# Patient Record
Sex: FEMALE | Race: WHITE | NOT HISPANIC OR LATINO | URBAN - METROPOLITAN AREA
[De-identification: names, ages, dates, MRNs, and addresses within clinical notes are randomized per-mention and may not be internally consistent; named-entity substitution may affect disease eponyms.]

---

## 2019-10-20 ENCOUNTER — EMERGENCY (EMERGENCY)
Facility: HOSPITAL | Age: 59
LOS: 1 days | Discharge: HOME | End: 2019-10-20
Attending: EMERGENCY MEDICINE | Admitting: EMERGENCY MEDICINE
Payer: MEDICAID

## 2019-10-20 VITALS
WEIGHT: 218.92 LBS | HEIGHT: 70 IN | HEART RATE: 60 BPM | OXYGEN SATURATION: 96 % | DIASTOLIC BLOOD PRESSURE: 71 MMHG | RESPIRATION RATE: 18 BRPM | SYSTOLIC BLOOD PRESSURE: 106 MMHG | TEMPERATURE: 98 F

## 2019-10-20 DIAGNOSIS — B00.9 HERPESVIRAL INFECTION, UNSPECIFIED: Chronic | ICD-10-CM

## 2019-10-20 DIAGNOSIS — R85.81 ANAL HIGH RISK HUMAN PAPILLOMAVIRUS (HPV) DNA TEST POSITIVE: Chronic | ICD-10-CM

## 2019-10-20 PROCEDURE — 99283 EMERGENCY DEPT VISIT LOW MDM: CPT

## 2019-10-20 NOTE — ED PROVIDER NOTE - NSFOLLOWUPINSTRUCTIONS_ED_ALL_ED_FT
Head Injury, Adult  ImageThere are many types of head injuries. Head injuries can be as minor as a bump, or they can be more severe. More severe head injuries include:  A jarring injury to the brain (concussion).A bruise of the brain (contusion). This means there is bleeding in the brain that can cause swelling.A cracked skull (skull fracture).Bleeding in the brain that collects, clots, and forms a bump (hematoma).After a head injury, you may need to be observed for a while in the emergency department or urgent care. Sometimes admission to the hospital is needed.  After a head injury has happened, most problems occur within the first 24 hours, but side effects may occur up to 7–10 days after the injury. It is important to watch your condition for any changes.  What are the causes?  There are many possible causes of a head injury. A serious head injury may happen to someone who is in a car accident (motor vehicle collision). Other causes of major head injuries include bicycle or motorcycle accidents, sports injuries, and falls.  Risk factors  This condition is more likely to occur in people who:  Drink a lot of alcohol or use drugs.Are over the age of 65.Are at risk for falls.What are the symptoms?  There are many possible symptoms of a head injury. Visible symptoms of a head injury include a bruise, bump, or bleeding at the site of the injury. Other non-visible symptoms include:  Feeling sleepy or not being able to stay awake.Passing out.Headache.Seizures.Dizziness.Confusion.Memory problems.Nausea or vomiting.Other possible symptoms that may develop after the head injury include:  Poor attention and concentration.Fatigue or tiring easily.Irritability.Being uncomfortable around bright lights or loud noises.Anxiety or depression.Disturbed sleep.How is this diagnosed?  This condition can usually be diagnosed based on your symptoms, a description of the injury, and a physical exam. You may also have imaging tests done, such as a CT scan or MRI. You will also be closely watched.  How is this treated?  Treatment for this condition depends on the severity and type of injury you have. The main goal of treatment is to prevent complications and allow the brain time to heal.  For mild head injury, you may be sent home and treatment may include:  Observation. A responsible adult should stay with you for 24 hours after your injury and check on you often.Physical rest.Brain rest.Pain medicines.For severe brain injury, treatment may include:  Close observation. This includes hospitalization with frequent physical exams. You may need to go to a hospital that specializes in head injury.Pain medicines.Breathing support. This may include using a ventilator.Managing the pressure inside the brain (intracranial pressure, or ICP). This may include:  Monitoring the ICP.Giving medicines to decrease the ICP.Positioning you to decrease the ICP.Medicine to prevent seizures.Surgery to stop bleeding or to remove blood clots (craniotomy).Surgery to remove part of the skull (decompressive craniectomy). This allows room for the brain to swell.Follow these instructions at home:  Activity     Rest as much as possible and avoid activities that are physically hard or tiring.Make sure you get enough sleep.Limit activities that require a lot of thought or attention, such as:  Watching TV.Playing memory games and puzzles.Job-related work or homework.Working on the computer, social media, and texting.Avoid activities that could cause another head injury, such as playing sports, until your health care provider approves. Having another head injury, especially before the first one has healed, can be dangerous.Ask your health care provider when it is safe for you to return to your regular activities, including work or school. Ask your health care provider for a step-by-step plan for gradually returning to activities.Ask your health care provider when you can drive, ride a bicycle, or use heavy machinery. Your ability to react may be slower after a brain injury. Never do these activities if you are dizzy.Lifestyle     Do not drink alcohol until your health care provider approves, and avoid drug use. Alcohol and certain drugs may slow your recovery and can put you at risk of further injury.If it is harder than usual to remember things, write them down.If you are easily distracted, try to do one thing at a time.Talk with family members or close friends when making important decisions.Tell your friends, family, a trusted colleague, and  about your injury, symptoms, and restrictions. Have them watch for any new or worsening problems.General instructions     Take over-the-counter and prescription medicines only as told by your health care provider.Have someone stay with you for 24 hours after your head injury. This person should watch you for any changes in your symptoms and be ready to seek medical help, as needed.Keep all follow-up visits as told by your health care provider. This is important.How is this prevented?  Work on improving your balance and strength to avoid falls.Wear a seatbelt when you are in a moving vehicle.Wear a helmet when riding a bicycle, skiing, or doing any other sport or activity that has a risk of injury.Drink alcohol only in moderation.Take safety measures in your home, such as:  Removing clutter and tripping hazards from floors and stairways.Using grab bars in bathrooms and handrails by stairs.Placing non-slip mats on floors and in bathtubs.Improving lighting in dim areas.Get help right away if:  You have:  A severe headache that is not helped by medicine.Trouble walking, have weakness in your arms and legs, or lose your balance.Clear or bloody fluid coming from your nose or ears.Changes in your vision.A seizure.You vomit.Your symptoms get worse.Your speech is slurred.You pass out.You are sleepier and have trouble staying awake.Your pupils change size.These symptoms may represent a serious problem that is an emergency. Do not wait to see if the symptoms will go away. Get medical help right away. Call your local emergency services (911 in the [place] Do not drive yourself to the hospital.   This information is not intended to replace advice given to you by your health care provider. Make sure you discuss any questions you have with your health care provider.    Document Released: 12/18/2006 Document Revised: 04/13/2018 Document Reviewed: 06/27/2017  Elsevier Interactive Patient Education © 2019 [employer]

## 2019-10-20 NOTE — ED PROVIDER NOTE - OBJECTIVE STATEMENT
60 y/o F with PMHx of HPV presents to the ED s/p fall. Patient states she was in the shower when she fell and banged her head on the L side and heard a cracking sensation prior to arrival. Patient notes mild pain to the hip and elbow. Patient is not on anticoagulation. Denies bleeding, nausea, LOC or any other acute complaints. 60 y/o F with PMHx of HPV presents to the ED s/p fall. Patient states she was in the shower when she slipped and hit L elbow, L side of her  head  heard a cracking sensation prior to arrival. Patient notes mild pain to the hip and elbow. Patient is not on anticoagulation. Denies bleeding, nausea, LOC or any other acute complaints.  no exac/allev factors  mild-moderate severity  no neck pain

## 2019-10-20 NOTE — ED PROVIDER NOTE - PATIENT PORTAL LINK FT
You can access the FollowMyHealth Patient Portal offered by [facility] by registering at the following website: [email or web address]. By joining [employer]’s FollowMyHealth portal, you will also be able to view your health information using other applications (apps) compatible with our system.

## 2019-10-20 NOTE — ED ADULT NURSE NOTE - OBJECTIVE STATEMENT
Pt smacked her head on the side of the tub. No LOC. No dizziness, HA, blurred vision. No obvious bleeding or deformity.

## 2019-10-20 NOTE — ED PROVIDER NOTE - ENMT, MLM
Airway patent, Nasal mucosa clear. Mouth with normal mucosa. No hemotympanum bilaterally. Airway patent, Nasal mucosa clear. Mouth with normal mucosa. No hemotympanum bilaterally.  no temporal/parietal bone ttp/stepoffs/hematoma

## 2019-10-20 NOTE — ED ADULT NURSE NOTE - NSIMPLEMENTINTERV_GEN_ALL_ED
Implemented All Universal Safety Interventions:  [place] to call system. Call bell, personal items and telephone within reach. Instruct patient to call for assistance. Room bathroom lighting operational. Non-slip footwear when patient is off stretcher. Physically safe environment: no spills, clutter or unnecessary equipment. Stretcher in lowest position, wheels locked, appropriate side rails in place.

## 2019-10-20 NOTE — ED PROVIDER NOTE - CLINICAL SUMMARY MEDICAL DECISION MAKING FREE TEXT BOX
slip and fall- hit L side of head- no loc- no CT ind as per [place] head CT rule- px w min to no ha  si/sx to return explained to px
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
## 2019-10-25 DIAGNOSIS — S09.90XA UNSPECIFIED INJURY OF HEAD, INITIAL ENCOUNTER: ICD-10-CM
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
## 2019-10-25 DIAGNOSIS — M25.522 PAIN IN LEFT ELBOW: ICD-10-CM
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
## 2020-08-17 PROBLEM — B97.7 PAPILLOMAVIRUS AS THE CAUSE OF DISEASES CLASSIFIED ELSEWHERE: Chronic | Status: ACTIVE | Noted: 2019-10-20

## 2020-09-04 PROBLEM — Z00.00 ENCOUNTER FOR PREVENTIVE HEALTH EXAMINATION: Status: ACTIVE | Noted: 2020-09-04

## 2020-09-16 ENCOUNTER — APPOINTMENT (OUTPATIENT)
Dept: HEART AND VASCULAR | Facility: CLINIC | Age: 60
End: 2020-09-16

## 2021-06-24 ENCOUNTER — APPOINTMENT (OUTPATIENT)
Dept: ORTHOPEDIC SURGERY | Facility: CLINIC | Age: 61
End: 2021-06-24
Payer: MEDICAID

## 2021-06-24 VITALS
SYSTOLIC BLOOD PRESSURE: 105 MMHG | HEART RATE: 65 BPM | HEIGHT: 70 IN | WEIGHT: 239 LBS | BODY MASS INDEX: 34.22 KG/M2 | DIASTOLIC BLOOD PRESSURE: 72 MMHG

## 2021-06-24 DIAGNOSIS — S83.242A OTHER TEAR OF MEDIAL MENISCUS, CURRENT INJURY, LEFT KNEE, INITIAL ENCOUNTER: ICD-10-CM

## 2021-06-24 DIAGNOSIS — Z82.69 FAMILY HISTORY OF OTHER DISEASES OF THE MUSCULOSKELETAL SYSTEM AND CONNECTIVE TISSUE: ICD-10-CM

## 2021-06-24 DIAGNOSIS — Z78.9 OTHER SPECIFIED HEALTH STATUS: ICD-10-CM

## 2021-06-24 DIAGNOSIS — Z60.2 PROBLEMS RELATED TO LIVING ALONE: ICD-10-CM

## 2021-06-24 DIAGNOSIS — Z80.0 FAMILY HISTORY OF MALIGNANT NEOPLASM OF DIGESTIVE ORGANS: ICD-10-CM

## 2021-06-24 DIAGNOSIS — S83.412A SPRAIN OF MEDIAL COLLATERAL LIGAMENT OF LEFT KNEE, INITIAL ENCOUNTER: ICD-10-CM

## 2021-06-24 DIAGNOSIS — Z80.3 FAMILY HISTORY OF MALIGNANT NEOPLASM OF BREAST: ICD-10-CM

## 2021-06-24 DIAGNOSIS — Z82.61 FAMILY HISTORY OF ARTHRITIS: ICD-10-CM

## 2021-06-24 DIAGNOSIS — Z72.3 LACK OF PHYSICAL EXERCISE: ICD-10-CM

## 2021-06-24 DIAGNOSIS — Z56.0 UNEMPLOYMENT, UNSPECIFIED: ICD-10-CM

## 2021-06-24 PROCEDURE — 99204 OFFICE O/P NEW MOD 45 MIN: CPT | Mod: 25

## 2021-06-24 PROCEDURE — 73564 X-RAY EXAM KNEE 4 OR MORE: CPT | Mod: RT

## 2021-06-24 PROCEDURE — 20610 DRAIN/INJ JOINT/BURSA W/O US: CPT | Mod: LT

## 2021-06-24 RX ORDER — COLD-HOT PACK
EACH MISCELLANEOUS
Refills: 0 | Status: ACTIVE | COMMUNITY

## 2021-06-24 RX ORDER — UBIDECARENONE/VIT E ACET 100MG-5
CAPSULE ORAL
Refills: 0 | Status: ACTIVE | COMMUNITY

## 2021-06-24 RX ORDER — SPIRONOLACTONE 50 MG/1
TABLET ORAL
Refills: 0 | Status: ACTIVE | COMMUNITY

## 2021-06-24 RX ORDER — MINOXIDIL 2.5 MG/1
TABLET ORAL
Refills: 0 | Status: ACTIVE | COMMUNITY

## 2021-06-24 RX ORDER — ESTRADIOL 10 UG/1
TABLET, FILM COATED VAGINAL
Refills: 0 | Status: ACTIVE | COMMUNITY

## 2021-06-24 RX ORDER — BIOTIN 10 MG
TABLET ORAL
Refills: 0 | Status: ACTIVE | COMMUNITY

## 2021-06-24 SDOH — SOCIAL STABILITY - SOCIAL INSECURITY: PROBLEMS RELATED TO LIVING ALONE: Z60.2

## 2021-06-24 SDOH — ECONOMIC STABILITY - INCOME SECURITY: UNEMPLOYMENT, UNSPECIFIED: Z56.0

## 2021-06-24 NOTE — DISCUSSION/SUMMARY
[de-identified] : Acute medial meniscus tear and MCL sprain now 4 months ago with no Rx to date except oral NSAIDS. Plus add hinged knee brace for the MCL. \par WIll attempt one more time to treat non operatively as that is her preference with cortisone and physical Therapy If no benefit down the road will consider Arthroscopic partial medial menisectomy. \par cc Dr Chaz Braun\par \par \par

## 2021-06-24 NOTE — PHYSICAL EXAM
[de-identified] : Patient is well nourished, well-developed, in no acute distress, with appropriate mood and affect. The patient is oriented to time, place, and person. Gait evaluation does not reveal a limp. The skin examination does not reveal obvious lesions, lacerations, or ecchymosis.\par Medial joint tenderness positive Tyrone and positive vielka negative lachman \par Quads 4/5 \par  [de-identified] : 4 views of knee show good normal joint spaces and no malalignment \par MRI done prior shows no chondral wear positive medial meniscus tear.

## 2021-06-24 NOTE — PROCEDURE
[de-identified] : The left knee was prepped with alchohol and ethyl chloride was used to numb the skin. First we tapped the knee and took out 20 cc joint fluid and then 3 cc injection with 1 cc xylocaine, 1cc sensoricaine and 1 cc depomedrol , was given without complication into the knee joint. Patient instructed that there may be an inflammatory flare for 24 hrs , to use ice or advil if needed\par

## 2021-06-24 NOTE — HISTORY OF PRESENT ILLNESS
[de-identified] : 60 year old female presents for left knee pain.  pain was initially in the left knee but her right knee started hurting to overcompensate for the pain in her right knee. the pain in her left knee began when she had a bone density test done. tech twisted the knee in such a way that she tore her meniscus. went to her pcp who sent her to get an x-ray at [facility]. she went back to her pcp who sent her for an MRI. also at [facility]. she went back to her pcp who recommended that she see dr alas.

## 2021-07-12 NOTE — ED PROVIDER NOTE - MUSCULOSKELETAL [+], MLM
Quality 431: Preventive Care And Screening: Unhealthy Alcohol Use - Screening: Patient screened for unhealthy alcohol use using a single question and scores less than 2 times per year elbow pain

## 2021-07-22 ENCOUNTER — APPOINTMENT (OUTPATIENT)
Dept: ORTHOPEDIC SURGERY | Facility: CLINIC | Age: 61
End: 2021-07-22
Payer: MEDICAID

## 2021-07-22 DIAGNOSIS — S83.242D OTHER TEAR OF MEDIAL MENISCUS, CURRENT INJURY, LEFT KNEE, SUBSEQUENT ENCOUNTER: ICD-10-CM

## 2021-07-22 PROCEDURE — 99213 OFFICE O/P EST LOW 20 MIN: CPT

## 2021-07-22 NOTE — DISCUSSION/SUMMARY
[de-identified] : Will continue PT to see if that can control meniscal symptoms. If not consider arthroscopic surgery in 2 months.

## 2021-07-22 NOTE — HISTORY OF PRESENT ILLNESS
[de-identified] : 59 y/o female presents for Left knee follow up. Patient states that she hasn’t been able to start PT but is due to start this week. Ha some benefit from cortisone but needs PT\par Still wants to avoid arthroscopic menisectomy if possible.

## 2021-09-23 ENCOUNTER — APPOINTMENT (OUTPATIENT)
Dept: ORTHOPEDIC SURGERY | Facility: CLINIC | Age: 61
End: 2021-09-23

## 2021-10-06 ENCOUNTER — APPOINTMENT (OUTPATIENT)
Dept: ORTHOPEDIC SURGERY | Facility: CLINIC | Age: 61
End: 2021-10-06
Payer: MEDICAID

## 2021-10-06 DIAGNOSIS — M17.12 UNILATERAL PRIMARY OSTEOARTHRITIS, LEFT KNEE: ICD-10-CM

## 2021-10-06 PROCEDURE — 99213 OFFICE O/P EST LOW 20 MIN: CPT

## 2021-10-06 NOTE — PHYSICAL EXAM
[de-identified] : Patient is well nourished, well-developed, in no acute distress, with appropriate mood and affect. The patient is oriented to time, place, and person. Gait evaluation does not reveal a limp. The skin examination does not reveal obvious lesions, lacerations, or ecchymosis.\par left knee no medial joint tenderness some medial PF tenderness and weak Quads. plus PF crepitance.

## 2021-10-06 NOTE — HISTORY OF PRESENT ILLNESS
[de-identified] : MS Easton just started PT 2 -3 weeks ago and is making some improvement.. Still has no medial joint symnptoms just anterio knee symptoms. \par

## 2021-10-06 NOTE — DISCUSSION/SUMMARY
[de-identified] : No meniscal signs at this time. 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 \par Continue [facility] PT program for 2 months.

## 2021-11-06 ENCOUNTER — EMERGENCY (EMERGENCY)
Facility: HOSPITAL | Age: 61
LOS: 1 days | Discharge: HOME | End: 2021-11-06
Attending: EMERGENCY MEDICINE | Admitting: EMERGENCY MEDICINE
Payer: MEDICAID

## 2021-11-06 VITALS
TEMPERATURE: 98 F | OXYGEN SATURATION: 98 % | DIASTOLIC BLOOD PRESSURE: 96 MMHG | HEART RATE: 54 BPM | WEIGHT: 220.02 LBS | RESPIRATION RATE: 18 BRPM | SYSTOLIC BLOOD PRESSURE: 152 MMHG | HEIGHT: 70 IN

## 2021-11-06 DIAGNOSIS — B00.9 HERPESVIRAL INFECTION, UNSPECIFIED: Chronic | ICD-10-CM

## 2021-11-06 DIAGNOSIS — S21.052A OPEN BITE OF LEFT BREAST, INITIAL ENCOUNTER: ICD-10-CM

## 2021-11-06 DIAGNOSIS — S61.452A OPEN BITE OF LEFT HAND, INITIAL ENCOUNTER: ICD-10-CM

## 2021-11-06 DIAGNOSIS — Z23 ENCOUNTER FOR IMMUNIZATION: ICD-10-CM

## 2021-11-06 DIAGNOSIS — S61.451A OPEN BITE OF RIGHT HAND, INITIAL ENCOUNTER: ICD-10-CM
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
## 2021-11-06 DIAGNOSIS — R85.81 ANAL HIGH RISK HUMAN PAPILLOMAVIRUS (HPV) DNA TEST POSITIVE: Chronic | ICD-10-CM

## 2021-11-06 PROCEDURE — 90715 TDAP VACCINE 7 YRS/> IM: CPT

## 2021-11-06 PROCEDURE — 90377 RABIES IG HT&SOL HUMAN IM/SC: CPT

## 2021-11-06 PROCEDURE — 90471 IMMUNIZATION ADMIN: CPT

## 2021-11-06 PROCEDURE — 90675 RABIES VACCINE IM: CPT

## 2021-11-06 PROCEDURE — 99284 EMERGENCY DEPT VISIT MOD MDM: CPT | Mod: 25

## 2021-11-06 PROCEDURE — 99283 EMERGENCY DEPT VISIT LOW MDM: CPT | Mod: 25

## 2021-11-06 PROCEDURE — 73120 X-RAY EXAM OF HAND: CPT | Mod: 26,LT,RT

## 2021-11-06 PROCEDURE — 73120 X-RAY EXAM OF HAND: CPT

## 2021-11-06 PROCEDURE — 73120 X-RAY EXAM OF HAND: CPT | Mod: 26

## 2021-11-06 RX ORDER — RABIES VACC, HUMAN DIPLOID/PF 2.5 UNIT
1 VIAL (EA) INTRAMUSCULAR ONCE
Refills: 0 | Status: DISCONTINUED | OUTPATIENT
Start: 2021-11-06 | End: 2021-11-06

## 2021-11-06 RX ORDER — HUMAN RABIES VIRUS IMMUNE GLOBULIN 150 [IU]/ML
2000 INJECTION, SOLUTION INTRAMUSCULAR ONCE
Refills: 0 | Status: COMPLETED | OUTPATIENT
Start: 2021-11-06 | End: 2021-11-06

## 2021-11-06 RX ORDER — TETANUS TOXOID, REDUCED DIPHTHERIA TOXOID AND ACELLULAR PERTUSSIS VACCINE, ADSORBED 5; 2.5; 8; 8; 2.5 [IU]/.5ML; [IU]/.5ML; UG/.5ML; UG/.5ML; UG/.5ML
0.5 SUSPENSION INTRAMUSCULAR ONCE
Refills: 0 | Status: COMPLETED | OUTPATIENT
Start: 2021-11-06 | End: 2021-11-06

## 2021-11-06 RX ORDER — RABIES VACC, HUMAN DIPLOID/PF 2.5 UNIT
1 VIAL (EA) INTRAMUSCULAR ONCE
Refills: 0 | Status: COMPLETED | OUTPATIENT
Start: 2021-11-06 | End: 2021-11-06

## 2021-11-06 RX ADMIN — HUMAN RABIES VIRUS IMMUNE GLOBULIN 2000 UNIT(S): 150 INJECTION, SOLUTION INTRAMUSCULAR at 16:00

## 2021-11-06 RX ADMIN — Medication 1 TABLET(S): at 15:47

## 2021-11-06 RX ADMIN — TETANUS TOXOID, REDUCED DIPHTHERIA TOXOID AND ACELLULAR PERTUSSIS VACCINE, ADSORBED 0.5 MILLILITER(S): 5; 2.5; 8; 8; 2.5 SUSPENSION INTRAMUSCULAR at 15:46

## 2021-11-06 RX ADMIN — Medication 1 MILLILITER(S): at 15:45

## 2021-11-06 NOTE — ED PROVIDER NOTE - PHYSICAL EXAMINATION
CONSTITUTIONAL: Awake, alert and in no apparent distress.  HEENT: Head is atraumatic. Eyes clear bilaterally, normal EOMI. Airway patent.  CARDIAC: Normal rate, regular rhythm.  Heart sounds S1, S2.   RESPIRATORY: Breath sounds clear and equal bilaterally. no tachypnea, respiratory distress.   GASTROINTESTINAL: Abdomen soft, non-tender, no guarding, distension.  MUSCULOSKELETAL: Spine appears normal, no midline spinal tenderness, range of motion is not limited, no muscle or joint tenderness. no bony tenderness. abrasions to b/l hands and upper wrists  NEUROLOGICAL: Alert, no focal deficits, no motor or sensory deficits.  SKIN: Skin normal color for race, warm, dry and intact. No evidence of rash.  PSYCHIATRIC: Normal mood and affect. no apparent risk to self or others.

## 2021-11-06 NOTE — ED PROVIDER NOTE - NSFOLLOWUPINSTRUCTIONS_ED_ALL_ED_FT
****Please return for vaccination 1 each on day 0 (already received), 3, 7 and 14      Wound Care, Adult      Taking care of your wound properly can help to prevent pain, infection, and scarring. It can also help your wound heal more quickly. Follow instructions from your health care provider about how to care for your wound.      Supplies needed:    •Soap and water.      •Wound cleanser.      •Gauze.      •If needed, a clean bandage (dressing) or other type of wound dressing material to cover or place in the wound. Follow your health care provider's instructions about what dressing supplies to use.      •Cream or ointment to apply to the wound, if told by your health care provider.        How to care for your wound    Cleaning the wound   Ask your health care provider how to clean the wound. This may include:  •Using mild soap and water or a wound cleanser.      •Using a clean gauze to pat the wound dry after cleaning it. Do not rub or scrub the wound.      Dressing care    •Wash your hands with soap and water for at least 20 seconds before and after you change the dressing. If soap and water are not available, use hand .    •Change your dressing as told by your health care provider. This may include:  •Cleaning or rinsing out (irrigating) the wound.      •Placing a dressing over the wound or in the wound (packing).      •Covering the wound with an outer dressing.        •Leave any stitches (sutures), skin glue, or adhesive strips in place. These skin closures may need to stay in place for 2 weeks or longer. If adhesive strip edges start to loosen and curl up, you may trim the loose edges. Do not remove adhesive strips completely unless your health care provider tells you to do that.      •Ask your health care provider when you can leave the wound uncovered.        Checking for infection  Check your wound area every day for signs of infection. Check for:  •More redness, swelling, or pain.      •Fluid or blood.      •Warmth.      •Pus or a bad smell.        Follow these instructions at home    Medicines     •If you were prescribed an antibiotic medicine, cream, or ointment, take or apply it as told by your health care provider. Do not stop using the antibiotic even if your condition improves.      •If you were prescribed pain medicine, take it 30 minutes before you do any wound care or as told by your health care provider.      •Take over-the-counter and prescription medicines only as told by your health care provider.      Eating and drinking   •Eat a diet that includes protein, vitamin A, vitamin C, and other nutrient-rich foods to help the wound heal.  •Foods rich in protein include meat, fish, eggs, dairy, beans, and nuts.      •Foods rich in vitamin A include carrots and dark green, leafy vegetables.      •Foods rich in vitamin C include citrus fruits, tomatoes, broccoli, and peppers.        •Drink enough fluid to keep your urine pale yellow.      General instructions     • Do not take baths, swim, use a hot tub, or do anything that would put the wound underwater until your health care provider approves. Ask your health care provider if you may take showers. You may only be allowed to take sponge baths.      • Do not scratch or pick at the wound. Keep it covered as told by your health care provider.      •Return to your normal activities as told by your health care provider. Ask your health care provider what activities are safe for you.      •Protect your wound from the sun when you are outside for the first 6 months, or for as long as told by your health care provider. Cover up the scar area or apply sunscreen that has an SPF of at least 30.      • Do not use any products that contain nicotine or tobacco, such as cigarettes, e-cigarettes, and chewing tobacco. These may delay wound healing. If you need help quitting, ask your health care provider.      •Keep all follow-up visits as told by your health care provider. This is important.        Contact a health care provider if:    •You received a tetanus shot and you have swelling, severe pain, redness, or bleeding at the injection site.      •Your pain is not controlled with medicine.    •You have any of these signs of infection:  •More redness, swelling, or pain around the wound.      •Fluid or blood coming from the wound.      •Warmth coming from the wound.      •Pus or a bad smell coming from the wound.      •A fever or chills.        •You are nauseous or you vomit.      •You are dizzy.        Get help right away if:    •You have a red streak of skin near the area around your wound.      •Your wound has been closed with staples, sutures, skin glue, or adhesive strips and it begins to open up and separate.      •Your wound is bleeding, and the bleeding does not stop with gentle pressure.      •You have a rash.      •You faint.      •You have trouble breathing.      These symptoms may represent a serious problem that is an emergency. Do not wait to see if the symptoms will go away. Get medical help right away. Call your local emergency services (911 in the [place] Do not drive yourself to the hospital.       Summary    •Always wash your hands with soap and water for at least 20 seconds before and after changing your dressing.      •Change your dressing as told by your health care provider.      •To help with healing, eat foods that are rich in protein, vitamin A, vitamin C, and other nutrients.      •Check your wound every day for signs of infection. Contact your health care provider if you suspect that your wound is infected.      This information is not intended to replace advice given to you by your health care provider. Make sure you discuss any questions you have with your health care provider.

## 2021-11-06 NOTE — ED ADULT NURSE NOTE - NSICDXPASTSURGICALHX_GEN_ALL_CORE_FT
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

## 2021-11-06 NOTE — ED PROVIDER NOTE - CLINICAL SUMMARY MEDICAL DECISION MAKING FREE TEXT BOX
60 y/o F s/p cat scratch/bite. Given cat is unvaccinated/feral, will initiate abx prophylaxis, tetanus and rabies prophylaxis. Will obtain XRs.

## 2021-11-06 NOTE — ED PROVIDER NOTE - OBJECTIVE STATEMENT
60 y/o F with PMHx of HPV coming into the ED for cat bite/scratch to b/l hands and scratch to L breast. Patient states she was trying to "save a feral cat" and was attacked, unsure of vaccinations but noted cat had clipped ear. No other complaints at this time.

## 2021-11-06 NOTE — ED ADULT TRIAGE NOTE - CHIEF COMPLAINT QUOTE
Pt presents to ED c/o cat scratch/bite to L hand and L breast. States "I was trying to save a feral cat and it attacked me". No bleeding at this time.

## 2021-11-06 NOTE — ED PROVIDER NOTE - PATIENT PORTAL LINK FT
You can access the FollowMyHealth Patient Portal offered by [facility] by registering at the following website: [email or web address]. By joining [employer]’s FollowMyHealth portal, you will also be able to view your health information using other applications (apps) compatible with our system.

## 2021-11-06 NOTE — ED PROVIDER NOTE - NSICDXPASTSURGICALHX_GEN_ALL_CORE_FT
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

## 2021-11-06 NOTE — ED PROVIDER NOTE - HISTORY ATTESTATION, MLM
I have reviewed and confirmed nurses' notes... Histology Selection Override (Optional- Will Default To Parent Diagnosis If N/A): Squamous Cell Carcinoma in situ

## 2021-11-06 NOTE — ED ADULT NURSE NOTE - OBJECTIVE STATEMENT
pt reports being bit and scratched by a feral cat around 12noon today.   pt has superficial puncture wounds to b/l wrists, right hand, and left 1st, 2nd, 3rd fingers.  Wounds cleansed with NSS.  no active bleeding. 2+ b/l radial pulses.

## 2021-11-09 ENCOUNTER — EMERGENCY (EMERGENCY)
Facility: HOSPITAL | Age: 61
LOS: 1 days | Discharge: HOME | End: 2021-11-09
Attending: EMERGENCY MEDICINE | Admitting: EMERGENCY MEDICINE
Payer: MEDICAID

## 2021-11-09 VITALS
SYSTOLIC BLOOD PRESSURE: 124 MMHG | WEIGHT: 220.02 LBS | DIASTOLIC BLOOD PRESSURE: 78 MMHG | HEIGHT: 70 IN | HEART RATE: 65 BPM | OXYGEN SATURATION: 98 % | RESPIRATION RATE: 18 BRPM | TEMPERATURE: 98 F

## 2021-11-09 DIAGNOSIS — Z23 ENCOUNTER FOR IMMUNIZATION: ICD-10-CM

## 2021-11-09 DIAGNOSIS — Z20.3 CONTACT WITH AND (SUSPECTED) EXPOSURE TO RABIES: ICD-10-CM

## 2021-11-09 DIAGNOSIS — R85.81 ANAL HIGH RISK HUMAN PAPILLOMAVIRUS (HPV) DNA TEST POSITIVE: Chronic | ICD-10-CM

## 2021-11-09 DIAGNOSIS — B00.9 HERPESVIRAL INFECTION, UNSPECIFIED: Chronic | ICD-10-CM

## 2021-11-09 PROCEDURE — 90675 RABIES VACCINE IM: CPT

## 2021-11-09 PROCEDURE — 99283 EMERGENCY DEPT VISIT LOW MDM: CPT

## 2021-11-09 PROCEDURE — 90471 IMMUNIZATION ADMIN: CPT

## 2021-11-09 PROCEDURE — 99281 EMR DPT VST MAYX REQ PHY/QHP: CPT | Mod: 25

## 2021-11-09 RX ORDER — RABIES VACC, HUMAN DIPLOID/PF 2.5 UNIT
1 VIAL (EA) INTRAMUSCULAR ONCE
Refills: 0 | Status: COMPLETED | OUTPATIENT
Start: 2021-11-09 | End: 2021-11-09

## 2021-11-09 RX ORDER — RABIES VACC, HUMAN DIPLOID/PF 2.5 UNIT
1 VIAL (EA) INTRAMUSCULAR ONCE
Refills: 0 | Status: DISCONTINUED | OUTPATIENT
Start: 2021-11-09 | End: 2021-11-09

## 2021-11-09 RX ADMIN — Medication 1 MILLILITER(S): at 17:29

## 2021-11-09 NOTE — ED PROVIDER NOTE - PATIENT PORTAL LINK FT
You can access the FollowMyHealth Patient Portal offered by [facility] by registering at the following website: [email or web address]. By joining [employer]’s FollowMyHealth portal, you will also be able to view your health information using other applications (apps) compatible with our system.

## 2021-11-09 NOTE — ED ADULT TRIAGE NOTE - CHIEF COMPLAINT QUOTE
Patient returns for 2nd dose rabies vaccine, stats was bitten by a moraima cat last week and received first set of toxoid and toxin last Sat.

## 2021-11-09 NOTE — ED PROVIDER NOTE - PHYSICAL EXAMINATION
CONSTITUTIONAL: Well appearing, awake, alert, oriented and in no apparent distress.  ENMT: Airway patent.  EYES: Clear bilaterally.  MUSCULOSKELETAL: Spine appears normal, range of motion is not limited.  NEUROLOGICAL: Alert and oriented, no focal deficits, no motor or sensory deficits.  SKIN: Skin normal color for race, warm, dry and intact. No evidence of rash. Wounds on hands are healing well, no signs of cellulitis.   PSYCHIATRIC: Alert and oriented. normal mood and affect. no apparent risk to self or others. CONSTITUTIONAL: Well appearing, awake, alert, oriented and in no apparent distress.  ENMT: Airway patent.  EYES: Clear bilaterally.  MUSCULOSKELETAL: Spine appears normal, range of motion is not limited.  NEUROLOGICAL: Alert and oriented   SKIN: Skin normal color for race, warm, dry and intact. No evidence of rash. Wounds on hands are healing well, no signs of cellulitis.   PSYCHIATRIC: Alert and oriented. normal mood and affect. no apparent risk to self or others.

## 2021-11-09 NOTE — ED PROVIDER NOTE - OBJECTIVE STATEMENT
60 y/o F presents to ED for rabies vaccine. Pt was bitten and scratched on the hands and left breast by a cat 3 days ago and was seen at [facility] ED. She was given a rabies vaccine and immunoglobulin for rabies, and was told to return today for next vaccine in series. Pt's wounds are healing well. Denies fevers, chills. No other complaints. No adverse reactions from the first vaccine.

## 2021-11-09 NOTE — ED ADULT NURSE NOTE - CCCP TRG CHIEF CMPLNT
rabies follow up I have personally performed a face to face diagnostic evaluation on this patient. I have reviewed the ACP note and agree with the history, exam and plan of care, except as noted.

## 2021-11-09 NOTE — ED PROVIDER NOTE - CLINICAL SUMMARY MEDICAL DECISION MAKING FREE TEXT BOX
60 y/o F presenting for second vaccine in rabies series. Given vaccine today and was instructed to return on 11/13/2021 for next vaccine in series.

## 2021-11-13 ENCOUNTER — EMERGENCY (EMERGENCY)
Facility: HOSPITAL | Age: 61
LOS: 1 days | Discharge: HOME | End: 2021-11-13
Admitting: EMERGENCY MEDICINE
Payer: MEDICAID

## 2021-11-13 VITALS
DIASTOLIC BLOOD PRESSURE: 80 MMHG | RESPIRATION RATE: 18 BRPM | OXYGEN SATURATION: 99 % | SYSTOLIC BLOOD PRESSURE: 125 MMHG | HEART RATE: 75 BPM | HEIGHT: 70 IN | TEMPERATURE: 98 F

## 2021-11-13 DIAGNOSIS — R85.81 ANAL HIGH RISK HUMAN PAPILLOMAVIRUS (HPV) DNA TEST POSITIVE: Chronic | ICD-10-CM

## 2021-11-13 DIAGNOSIS — Z23 ENCOUNTER FOR IMMUNIZATION: ICD-10-CM

## 2021-11-13 DIAGNOSIS — Z20.3 CONTACT WITH AND (SUSPECTED) EXPOSURE TO RABIES: ICD-10-CM

## 2021-11-13 DIAGNOSIS — B00.9 HERPESVIRAL INFECTION, UNSPECIFIED: Chronic | ICD-10-CM

## 2021-11-13 PROCEDURE — 99281 EMR DPT VST MAYX REQ PHY/QHP: CPT | Mod: 25

## 2021-11-13 PROCEDURE — 90471 IMMUNIZATION ADMIN: CPT

## 2021-11-13 PROCEDURE — 99282 EMERGENCY DEPT VISIT SF MDM: CPT

## 2021-11-13 PROCEDURE — 90675 RABIES VACCINE IM: CPT

## 2021-11-13 RX ORDER — RABIES VACC, HUMAN DIPLOID/PF 2.5 UNIT
1 VIAL (EA) INTRAMUSCULAR ONCE
Refills: 0 | Status: DISCONTINUED | OUTPATIENT
Start: 2021-11-13 | End: 2021-11-13

## 2021-11-13 RX ORDER — RABIES VACC, HUMAN DIPLOID/PF 2.5 UNIT
1 VIAL (EA) INTRAMUSCULAR ONCE
Refills: 0 | Status: COMPLETED | OUTPATIENT
Start: 2021-11-13 | End: 2021-11-13

## 2021-11-13 RX ADMIN — Medication 1 MILLILITER(S): at 12:49

## 2021-11-13 NOTE — ED PROVIDER NOTE - NSFOLLOWUPINSTRUCTIONS_ED_ALL_ED_FT
Rabies Vaccine  WHAT YOU NEED TO KNOW:  What is the rabies vaccine? The rabies vaccine is an injection given to help prevent infection from the virus that causes rabies. The rabies virus is spread to humans through the bite of an infected animal. Dogs, bats, skunks, coyotes, raccoons, and foxes are examples of animals that can carry rabies. The rabies vaccine can protect you from being infected with the virus. The vaccine can also prevent you from developing rabies even if you get it after you were bitten by an infected animal.  When is the vaccine given? Your healthcare provider will tell you how many doses of the vaccine you need. He or she will give you an injection schedule. Plan to get all of the doses on the days they are scheduled, especially the first 2 doses. Do not put off getting the injections or try to schedule them all for the same day. Tell your provider if you think anything may keep you from getting all the doses as scheduled. He or she may be able to help you find ways to stay on schedule. The following is a common dosing schedule:   •Before exposure to the virus, the vaccine is given in 3 doses. The first dose can be given at any time. The second dose is given 7 days after the first. The third dose is given 21 or 28 days after the first. Plan to get all of the doses 3 to 4 weeks before you travel.  •After exposure to the virus, the vaccine is usually given in 2 or 4 doses: ?A person who has not already had the vaccine will usually get 4 doses. The first dose is given as soon after exposure to rabies as possible. A shot called rabies immune globulin is given at the same time as the first dose. This medicine helps your immune system fight the infection. The other doses are given on days 3, 7, and 14 after the exposure. You may also need a dose 28 days after the exposure if you have a weak immune system. Your healthcare provider will tell you if you need 4 or 5 doses.  ?A person who has already had the vaccine will get 2 doses. The first is given immediately, and the second is given on day 3 after the exposure. Rabies immune globulin is not given.  •Booster doses may be needed over time if you stay at high risk for rabies. You are at increased risk for rabies if: ?Your work involves handling animals that can carry rabies.  ?You work in a rabies laboratory.  ?You often go into caves where bats live.  ?You often travel to a country where rabies is common.  What should I do if I miss a dose or will miss a scheduled dose? Call your healthcare provider right away. He or she will tell you what to do. The best way to be protected is to stay on the injection schedule given to you. This is especially important if you are getting the vaccine after exposure to the rabies virus. Reschedule any makeup dose or upcoming dose for as close to the original appointment as possible. Remember that you cannot get more than 1 dose on any day.  Who should not get the rabies vaccine or should wait to get it?   •Tell your healthcare provider if you had a severe allergic reaction to the rabies vaccine or to another vaccine. If you are getting the vaccine before exposure, do not get another dose. After exposure, you need to get all the doses even if you are at risk for an allergic reaction. Your healthcare provider may need to take extra precautions before you get another dose.  •Tell your provider about all of your allergies. Also tell him or her if you have a disease that affects your immune system (such as HIV/AIDS) or you have cancer. Tell him or her if you are taking medicines that affect your immune system or any cancer treatment drug or radiation. Tell him or her if you are ill. You may need to wait to get the vaccine until you feel better.  What are the risks of the rabies vaccine? You may have a severe allergic reaction. The area where you got the shot may become red, swollen, or painful. You may develop a headache or muscle aches. You may have nausea or pain in your abdomen. You may develop rabies even after you get the vaccine.  Call your local emergency number (911 in the [place]) or have someone else call if:   •Your mouth and throat are swollen.  •You are wheezing or have trouble breathing.  •You have chest pain or your heart is beating faster than normal for you.  •You feel like you are going to faint.  When should I seek immediate care?   •Your face is red or swollen.  •You have hives that spread over your body.  •You feel weak or dizzy.  When should I call my doctor?   •You have increased pain, redness, or swelling around the area where the shot was given.  •You have flu-like symptoms.  •You have questions or concerns about the rabies vaccine.

## 2021-11-13 NOTE — ED ADULT NURSE NOTE - OBJECTIVE STATEMENT
pt is 61y female, here for 3rd rabies shot after being scratched and bit by a stray cat, denies any other complaints

## 2021-11-13 NOTE — ED PROVIDER NOTE - PATIENT PORTAL LINK FT
You can access the FollowMyHealth Patient Portal offered by [facility] by registering at the following website: [email or web address]. By joining [employer]’s FollowMyHealth portal, you will also be able to view your health information using other applications (apps) compatible with our system.

## 2021-11-13 NOTE — ED PROVIDER NOTE - CLINICAL SUMMARY MEDICAL DECISION MAKING FREE TEXT BOX
pt returns for 3rd rabies vaccine, no reaction to prior vaccines, bite sites healing well w/o any signs of inf, will return for 4th dose as scheduled

## 2021-11-13 NOTE — ED PROVIDER NOTE - OBJECTIVE STATEMENT
The pt is a 62 y/o F, who presents to ED for 3rd rabies vaccine - was bitten and scratched by stray cat. All bites healing well, no adverse reaction to prior rabies shots. Denies fevers, chills, myalgias, rash.

## 2021-12-08 ENCOUNTER — APPOINTMENT (OUTPATIENT)
Dept: ORTHOPEDIC SURGERY | Facility: CLINIC | Age: 61
End: 2021-12-08

## 2021-12-08 ENCOUNTER — EMERGENCY (EMERGENCY)
Facility: HOSPITAL | Age: 61
LOS: 1 days | Discharge: HOME | End: 2021-12-08
Admitting: EMERGENCY MEDICINE
Payer: MEDICAID

## 2021-12-08 VITALS
WEIGHT: 220.02 LBS | HEIGHT: 70 IN | OXYGEN SATURATION: 98 % | RESPIRATION RATE: 18 BRPM | HEART RATE: 56 BPM | SYSTOLIC BLOOD PRESSURE: 128 MMHG | DIASTOLIC BLOOD PRESSURE: 80 MMHG | TEMPERATURE: 98 F

## 2021-12-08 DIAGNOSIS — Z20.3 CONTACT WITH AND (SUSPECTED) EXPOSURE TO RABIES: ICD-10-CM

## 2021-12-08 DIAGNOSIS — B00.9 HERPESVIRAL INFECTION, UNSPECIFIED: Chronic | ICD-10-CM

## 2021-12-08 DIAGNOSIS — Z29.14 ENCOUNTER FOR PROPHYLACTIC RABIES IMMUNE GLOBIN: ICD-10-CM

## 2021-12-08 DIAGNOSIS — Z23 ENCOUNTER FOR IMMUNIZATION: ICD-10-CM

## 2021-12-08 DIAGNOSIS — R85.81 ANAL HIGH RISK HUMAN PAPILLOMAVIRUS (HPV) DNA TEST POSITIVE: Chronic | ICD-10-CM

## 2021-12-08 PROCEDURE — 99281 EMR DPT VST MAYX REQ PHY/QHP: CPT | Mod: 25

## 2021-12-08 PROCEDURE — 90675 RABIES VACCINE IM: CPT

## 2021-12-08 PROCEDURE — 99283 EMERGENCY DEPT VISIT LOW MDM: CPT

## 2021-12-08 PROCEDURE — 90471 IMMUNIZATION ADMIN: CPT

## 2021-12-08 RX ORDER — RABIES VACC, HUMAN DIPLOID/PF 2.5 UNIT
1 VIAL (EA) INTRAMUSCULAR ONCE
Refills: 0 | Status: COMPLETED | OUTPATIENT
Start: 2021-12-08 | End: 2021-12-08

## 2021-12-08 RX ORDER — RABIES VACC, HUMAN DIPLOID/PF 2.5 UNIT
1 VIAL (EA) INTRAMUSCULAR ONCE
Refills: 0 | Status: DISCONTINUED | OUTPATIENT
Start: 2021-12-08 | End: 2021-12-08

## 2021-12-08 RX ADMIN — Medication 1 MILLILITER(S): at 14:44

## 2021-12-08 NOTE — ED PROVIDER NOTE - OBJECTIVE STATEMENT
61 healthy F here requesting final rabies vaccine s/p bitten and scratched by stray cat on 11/6.  pt last seen for dose 3 on 11/13 states she read on her dc papers to come back 21-28 days later, which she understood 21-28 days for 11/13.  Pt has no complaints. Wounds completely heal.  Denies f/c, rash, myalgias

## 2021-12-08 NOTE — ED ADULT NURSE NOTE - CAS ELECT INFOMATION PROVIDED
Try valsartan for another 2 weeks, if it causes symptoms then call clinic or send message and we can change to one of two options  1. Lisinopril  2. Amlodipine - can cause leg swelling in some people   
DC instructions

## 2021-12-08 NOTE — ED PROVIDER NOTE - PHYSICAL EXAMINATION
Gen: well appearing, no acute distress  Skin: warm/dry, no rash or wounds noted  Resp: breathing comfortably, speaking in full sentences, no dyspnea  Neuro: alert/oriented, ambulatory

## 2021-12-08 NOTE — ED PROVIDER NOTE - NSFOLLOWUPINSTRUCTIONS_ED_ALL_ED_FT
YOU MUST have antibody titers verified on blood sample 14-28 days from final vaccine (14-28 days from today).  Please contact your primary care doctor and department of health to set up appointment.

## 2021-12-08 NOTE — ED PROVIDER NOTE - CLINICAL SUMMARY MEDICAL DECISION MAKING FREE TEXT BOX
61 healthy F here requesting final rabies vaccine s/p bitten and scratched by stray cat on 11/6. pt misunderstood timing of rabies vaccine schedule, 3rd dose was on 11/13.  here requesting last dose, no complaints.  reviewed [facility] guidelines and will give last dose but pt must have antibody titers verified on serum sample 14-28 days from final vaccine (today).  informed and will contact pmd and [facility] to set up titers.  discussed strict return parameters

## 2021-12-08 NOTE — ED PROVIDER NOTE - PATIENT PORTAL LINK FT
You can access the FollowMyHealth Patient Portal offered by [facility] by registering at the following website: [email or web address]. By joining [employer]’s FollowMyHealth portal, you will also be able to view your health information using other applications (apps) compatible with our system.

## 2021-12-08 NOTE — ED ADULT NURSE NOTE - NSIMPLEMENTINTERV_GEN_ALL_ED
Implemented All Universal Safety Interventions:  [place] to call system. Call bell, personal items and telephone within reach. Instruct patient to call for assistance. Room bathroom lighting operational. Non-slip footwear when patient is off stretcher. Physically safe environment: no spills, clutter or unnecessary equipment. Stretcher in lowest position, wheels locked, appropriate side rails in place.

## 2021-12-16 NOTE — ED PROVIDER NOTE - DISCHARGE DATE
pt endorse by GRISELDA rodriguez. pt present to ED for abd pain since this morning. pt has hx of gastric Ca, BM this morning, no changes or blood noted. pt denies any n/d during the assessment. pt did have one episode of non-bloody, non-bilious vomit, states to feel better right after. pending CTr and medication.
13-Nov-2021

## 2022-12-07 NOTE — ED PROVIDER NOTE - NS_EDPROVIDERDISPOUSERTYPE_ED_A_ED
Opt out I have personally evaluated and examined the patient. The Attending was available to me as a supervising provider if needed.

## 2024-09-09 NOTE — ED ADULT TRIAGE NOTE - ARRIVAL INFO ADDITIONAL COMMENTS
show states she slipped and fell in bathtub and "heard a crunch" after hitting head. denies any nausea, vomiting, LOC, visual changes, use of blood thinners. PERRLA noted. skin is intact, no observable abnormalities noted to head.

## 2025-04-02 ENCOUNTER — APPOINTMENT (OUTPATIENT)
Dept: ORTHOPEDIC SURGERY | Facility: CLINIC | Age: 65
End: 2025-04-02
Payer: MEDICAID

## 2025-04-02 VITALS — BODY MASS INDEX: 34.22 KG/M2 | WEIGHT: 239 LBS | HEIGHT: 70 IN

## 2025-04-02 DIAGNOSIS — M17.5 OTHER UNILATERAL SECONDARY OSTEOARTHRITIS OF KNEE: ICD-10-CM

## 2025-04-02 PROCEDURE — 20610 DRAIN/INJ JOINT/BURSA W/O US: CPT | Mod: LT

## 2025-04-02 PROCEDURE — 73564 X-RAY EXAM KNEE 4 OR MORE: CPT | Mod: LT,RT

## 2025-04-02 PROCEDURE — 99203 OFFICE O/P NEW LOW 30 MIN: CPT | Mod: 25

## 2025-05-28 ENCOUNTER — APPOINTMENT (OUTPATIENT)
Dept: ORTHOPEDIC SURGERY | Facility: CLINIC | Age: 65
End: 2025-05-28

## 2025-07-07 ENCOUNTER — APPOINTMENT (OUTPATIENT)
Dept: ORTHOPEDIC SURGERY | Facility: CLINIC | Age: 65
End: 2025-07-07
Payer: MEDICAID

## 2025-07-07 PROCEDURE — 99213 OFFICE O/P EST LOW 20 MIN: CPT | Mod: 25

## 2025-07-07 PROCEDURE — 20610 DRAIN/INJ JOINT/BURSA W/O US: CPT | Mod: RT
